# Patient Record
Sex: MALE | URBAN - METROPOLITAN AREA
[De-identification: names, ages, dates, MRNs, and addresses within clinical notes are randomized per-mention and may not be internally consistent; named-entity substitution may affect disease eponyms.]

---

## 2024-02-01 ENCOUNTER — APPOINTMENT (RX ONLY)
Dept: URBAN - METROPOLITAN AREA CLINIC 134 | Facility: CLINIC | Age: 74
Setting detail: DERMATOLOGY
End: 2024-02-01

## 2024-02-01 DIAGNOSIS — Z41.9 ENCOUNTER FOR PROCEDURE FOR PURPOSES OTHER THAN REMEDYING HEALTH STATE, UNSPECIFIED: ICD-10-CM

## 2024-02-01 PROCEDURE — ? COSMETIC CONSULTATION - RED SPOTS

## 2024-02-01 PROCEDURE — ? PALOMAR ICON LASER

## 2024-02-01 PROCEDURE — ? COSMETIC CONSULTATION: BROWN SPOTS

## 2024-02-01 ASSESSMENT — LOCATION SIMPLE DESCRIPTION DERM
LOCATION SIMPLE: LEFT CHEEK
LOCATION SIMPLE: RIGHT CHEEK

## 2024-02-01 ASSESSMENT — LOCATION DETAILED DESCRIPTION DERM
LOCATION DETAILED: LEFT CENTRAL MALAR CHEEK
LOCATION DETAILED: RIGHT SUPERIOR MEDIAL MALAR CHEEK
LOCATION DETAILED: RIGHT CENTRAL MALAR CHEEK
LOCATION DETAILED: LEFT MEDIAL MALAR CHEEK

## 2024-02-01 ASSESSMENT — LOCATION ZONE DERM: LOCATION ZONE: FACE

## 2024-02-01 NOTE — PROCEDURE: COSMETIC CONSULTATION: BROWN SPOTS
Detail Level: Zone
Procedure Quote $ (Will Render In Note. Use Numbers Only, No Text Please.): 200
Send Procedure Quote As Charge: No

## 2024-02-01 NOTE — PROCEDURE: PALOMAR ICON LASER
Render Post Care In The Note?: yes
Treated Area: medium area
Pulse Duration (In Milliseconds): 10
Number Of Passes: 1
Pulse Duration (In Milliseconds): 20
Pre-Procedure Text: The patients skin was cleaned and ultrasound gel applied.
Location: left forearm
Detail Level: Zone
Fluence: 26
Post-Care Instructions: I reviewed with the patient in detail post-care instructions. Patient should stay away from the sun and wear sun protection until treated areas are fully healed.
Fluence: 36
Overlap: 50%
Fluence Units: J/cm2
Price (Use Numbers Only, No Special Characters Or $): 200
Consent: Written consent obtained, risks reviewed including but not limited to crusting, scabbing, blistering, scarring, darker or lighter pigmentary change, bruising, and/or incomplete response.
External Cooling Fan Speed: 5
Pulse Duration (In Milliseconds): 15
Anesthesia Volume In Cc: 0

## 2024-02-29 ENCOUNTER — APPOINTMENT (RX ONLY)
Dept: URBAN - METROPOLITAN AREA CLINIC 134 | Facility: CLINIC | Age: 74
Setting detail: DERMATOLOGY
End: 2024-02-29

## 2024-02-29 DIAGNOSIS — Z41.9 ENCOUNTER FOR PROCEDURE FOR PURPOSES OTHER THAN REMEDYING HEALTH STATE, UNSPECIFIED: ICD-10-CM

## 2024-02-29 PROCEDURE — ? PALOMAR ICON LASER

## 2024-02-29 ASSESSMENT — LOCATION SIMPLE DESCRIPTION DERM
LOCATION SIMPLE: RIGHT CHEEK
LOCATION SIMPLE: LEFT NOSE
LOCATION SIMPLE: LEFT CHEEK

## 2024-02-29 ASSESSMENT — LOCATION ZONE DERM
LOCATION ZONE: NOSE
LOCATION ZONE: FACE

## 2024-02-29 ASSESSMENT — LOCATION DETAILED DESCRIPTION DERM
LOCATION DETAILED: LEFT NASAL ALAR GROOVE
LOCATION DETAILED: RIGHT CENTRAL MALAR CHEEK
LOCATION DETAILED: LEFT SUPERIOR CENTRAL MALAR CHEEK

## 2024-02-29 NOTE — PROCEDURE: PALOMAR ICON LASER
Treatment Number: 2
Number Of Passes: 1
Fluence Units: J/cm2
Render Post Care In The Note?: yes
Overlap: 50%
Detail Level: Zone
Fluence: 36
Anesthesia Volume In Cc: 0
Fluence: 24
External Cooling Fan Speed: 5
Consent: Written consent obtained, risks reviewed including but not limited to crusting, scabbing, blistering, scarring, darker or lighter pigmentary change, bruising, and/or incomplete response.
Pre-Procedure Text: The patients skin was cleaned and ultrasound gel applied.
Pulse Duration (In Milliseconds): 20
Treated Area: medium area
Post-Care Instructions: I reviewed with the patient in detail post-care instructions. Patient should stay away from the sun and wear sun protection until treated areas are fully healed.
Pulse Duration (In Milliseconds): 10